# Patient Record
Sex: FEMALE | Race: WHITE
[De-identification: names, ages, dates, MRNs, and addresses within clinical notes are randomized per-mention and may not be internally consistent; named-entity substitution may affect disease eponyms.]

---

## 2017-04-12 ENCOUNTER — HOSPITAL ENCOUNTER (OUTPATIENT)
Dept: HOSPITAL 17 - PLAB | Age: 82
End: 2017-04-12
Attending: INTERNAL MEDICINE
Payer: MEDICARE

## 2017-04-12 DIAGNOSIS — E78.5: ICD-10-CM

## 2017-04-12 DIAGNOSIS — I10: Primary | ICD-10-CM

## 2017-04-12 LAB
ALP SERPL-CCNC: 50 U/L (ref 45–117)
ALT SERPL-CCNC: 27 U/L (ref 10–53)
ANION GAP SERPL CALC-SCNC: 6 MEQ/L (ref 5–15)
AST SERPL-CCNC: 24 U/L (ref 15–37)
BILIRUB SERPL-MCNC: 0.7 MG/DL (ref 0.2–1)
BUN SERPL-MCNC: 14 MG/DL (ref 7–18)
CHLORIDE SERPL-SCNC: 100 MEQ/L (ref 98–107)
ERYTHROCYTE [DISTWIDTH] IN BLOOD BY AUTOMATED COUNT: 13 % (ref 11.6–17.2)
GFR SERPLBLD BASED ON 1.73 SQ M-ARVRAT: 74 ML/MIN (ref 89–?)
HCO3 BLD-SCNC: 29.9 MEQ/L (ref 21–32)
HCT VFR BLD CALC: 39.7 % (ref 35–46)
HDLC SERPL-MCNC: 72.3 MG/DL (ref 40–60)
LDLC SERPL DIRECT ASSAY-MCNC: 101 MG/DL (ref 0–99)
LDLC SERPL-MCNC: 97 MG/DL (ref 0–99)
MCH RBC QN AUTO: 30.7 PG (ref 27–34)
MCHC RBC AUTO-ENTMCNC: 32.5 % (ref 32–36)
MCV RBC AUTO: 94.3 FL (ref 80–100)
PLATELET # BLD: 287 TH/MM3 (ref 150–450)
POTASSIUM SERPL-SCNC: 4.5 MEQ/L (ref 3.5–5.1)
RBC # BLD AUTO: 4.21 MIL/MM3 (ref 4–5.3)
REVIEW FLAG: (no result)
SODIUM SERPL-SCNC: 136 MEQ/L (ref 136–145)
T4 FREE SERPL-MCNC: 0.97 NG/DL (ref 0.76–1.46)
WBC # BLD AUTO: 3.8 TH/MM3 (ref 4–11)

## 2017-04-12 PROCEDURE — 84439 ASSAY OF FREE THYROXINE: CPT

## 2017-04-12 PROCEDURE — 36415 COLL VENOUS BLD VENIPUNCTURE: CPT

## 2017-04-12 PROCEDURE — 80053 COMPREHEN METABOLIC PANEL: CPT

## 2017-04-12 PROCEDURE — 80061 LIPID PANEL: CPT

## 2017-04-12 PROCEDURE — 85027 COMPLETE CBC AUTOMATED: CPT

## 2017-04-12 PROCEDURE — 83721 ASSAY OF BLOOD LIPOPROTEIN: CPT

## 2017-04-12 PROCEDURE — 84443 ASSAY THYROID STIM HORMONE: CPT

## 2017-08-22 ENCOUNTER — HOSPITAL ENCOUNTER (OUTPATIENT)
Dept: HOSPITAL 17 - PLAB | Age: 82
End: 2017-08-22
Attending: INTERNAL MEDICINE
Payer: MEDICARE

## 2017-08-22 DIAGNOSIS — I10: Primary | ICD-10-CM

## 2017-08-22 DIAGNOSIS — E78.5: ICD-10-CM

## 2017-08-22 LAB
ALP SERPL-CCNC: 52 U/L (ref 45–117)
ALT SERPL-CCNC: 28 U/L (ref 10–53)
ANION GAP SERPL CALC-SCNC: 6 MEQ/L (ref 5–15)
AST SERPL-CCNC: 21 U/L (ref 15–37)
BILIRUB SERPL-MCNC: 0.5 MG/DL (ref 0.2–1)
BUN SERPL-MCNC: 14 MG/DL (ref 7–18)
CHLORIDE SERPL-SCNC: 98 MEQ/L (ref 98–107)
ERYTHROCYTE [DISTWIDTH] IN BLOOD BY AUTOMATED COUNT: 12.6 % (ref 11.6–17.2)
GFR SERPLBLD BASED ON 1.73 SQ M-ARVRAT: 93 ML/MIN (ref 89–?)
HCO3 BLD-SCNC: 27.6 MEQ/L (ref 21–32)
HCT VFR BLD CALC: 39.2 % (ref 35–46)
HDLC SERPL-MCNC: 75.3 MG/DL (ref 40–60)
LDLC SERPL DIRECT ASSAY-MCNC: 87 MG/DL (ref 0–99)
LDLC SERPL-MCNC: 82 MG/DL (ref 0–99)
MCH RBC QN AUTO: 31.4 PG (ref 27–34)
MCHC RBC AUTO-ENTMCNC: 33 % (ref 32–36)
MCV RBC AUTO: 95.3 FL (ref 80–100)
PLATELET # BLD: 272 TH/MM3 (ref 150–450)
POTASSIUM SERPL-SCNC: 3.6 MEQ/L (ref 3.5–5.1)
RBC # BLD AUTO: 4.11 MIL/MM3 (ref 4–5.3)
REVIEW FLAG: (no result)
SODIUM SERPL-SCNC: 132 MEQ/L (ref 136–145)
WBC # BLD AUTO: 4.4 TH/MM3 (ref 4–11)

## 2017-08-22 PROCEDURE — 80053 COMPREHEN METABOLIC PANEL: CPT

## 2017-08-22 PROCEDURE — 80061 LIPID PANEL: CPT

## 2017-08-22 PROCEDURE — 36415 COLL VENOUS BLD VENIPUNCTURE: CPT

## 2017-08-22 PROCEDURE — 83721 ASSAY OF BLOOD LIPOPROTEIN: CPT

## 2017-08-22 PROCEDURE — 85027 COMPLETE CBC AUTOMATED: CPT

## 2017-09-08 ENCOUNTER — HOSPITAL ENCOUNTER (EMERGENCY)
Dept: HOSPITAL 17 - PHED | Age: 82
Discharge: HOME | End: 2017-09-08
Payer: MEDICARE

## 2017-09-08 VITALS
DIASTOLIC BLOOD PRESSURE: 72 MMHG | OXYGEN SATURATION: 98 % | RESPIRATION RATE: 15 BRPM | HEART RATE: 73 BPM | TEMPERATURE: 98.2 F | SYSTOLIC BLOOD PRESSURE: 158 MMHG

## 2017-09-08 VITALS — DIASTOLIC BLOOD PRESSURE: 72 MMHG | SYSTOLIC BLOOD PRESSURE: 150 MMHG

## 2017-09-08 VITALS — BODY MASS INDEX: 23.52 KG/M2 | WEIGHT: 124.56 LBS | HEIGHT: 61 IN

## 2017-09-08 DIAGNOSIS — Z23: ICD-10-CM
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
## 2017-09-08 DIAGNOSIS — S81.812A: Primary | ICD-10-CM

## 2017-09-08 PROCEDURE — 12035 INTMD RPR S/A/T/EXT 12.6-20: CPT

## 2017-09-08 PROCEDURE — 73590 X-RAY EXAM OF LOWER LEG: CPT

## 2017-09-08 PROCEDURE — 90714 TD VACC NO PRESV 7 YRS+ IM: CPT

## 2017-09-08 PROCEDURE — 90471 IMMUNIZATION ADMIN: CPT

## 2017-09-08 NOTE — RADRPT
EXAM DATE/TIME:  09/08/2017 18:27 

 

[facility] COMPARISON:     

No previous studies available for comparison.

 

                     

INDICATIONS :     

Left distal tibia/fibula pain with laceration. 

                     

 

MEDICAL HISTORY :     

Hypercholesterolemia.  Arthritis.        

 

SURGICAL HISTORY :     

Tonsillectomy. Total knee replacement, right.  Cataract surgery

 

ENCOUNTER:     

Initial                                        

 

ACUITY:     

1 day      

 

PAIN SCORE:     

5/10

 

LOCATION:     

Left distal tibia/fibula

 

FINDINGS:     

Two view examination of the left tibia demonstrates no evidence of fracture or dislocation. Laceratio
n present distal leg with no radiopaque foreign body.

 

CONCLUSION:     

1. No acute bony abnormality. Soft tissue laceration distal left leg

 

 

 

 Gaurav Matos MD on September 08, 2017 at 18:53           

Board Certified Radiologist.

 This report was verified electronically.

## 2017-09-08 NOTE — PD
Physical Exam


Date Seen by Provider:  Sep 8, 2017


Time Seen by Provider:  20:05


Narrative


80-year-old female that presents to the ED for evaluation of laceration.  I was 

asked by my attending to repair the laceration.  Please refer to his note.





Data


Data


Last Documented VS





Vital Signs








  Date Time  Temp Pulse Resp B/P (MAP) Pulse Ox O2 Delivery O2 Flow Rate FiO2


 


9/8/17 17:50 98.2 73 15 158/72 (100) 98   








Orders





 Orders


Tibia/Fibula (Ap/Lat) (9/8/17 18:03)


Tetanus/Diphtheria Tox Adult (Tetanus/Di (9/8/17 18:00)


Lidocai-Epi 1%-1:100,000 Inj (Xylocaine- (9/8/17 18:00)


Cephalexin (Keflex) (9/8/17 19:30)








MDM


Medical Record Reviewed:  Yes


Supervised Visit with HARLAN:  No


Procedures


**Procedure Narrative**


LACERATION


LOCATION: left leg


LENGTH: 15 cm 


NUMBER OF STITCHES/STAPLES: 20 sutures, 7 deep sutures





REPAIR: The area of the laceration was prepped with Betadine and sterilely 

draped.  The laceration was infiltrated with 1% Xylocaine.  The wound was 

copiously irrigated and explored without evidence of foreign body, tendon 

injury or neurovascular injury.  The wound was closed using 3-0 Vycril and 

Prolene. This was a 2 layer repair. A sterile dressing was applied. The patient 

was advised to keep the dressing clean and dry. Patient tolerated the procedure 

well.





Diagnosis





 Primary Impression:  


 Laceration of left lower leg without complication


 Qualified Codes:  S81.812A - Laceration without foreign body, left lower leg, 

initial encounter


Patient Instructions:  General Instructions





***Additional Instruction:  


Wound care daily.  Keflex as directed.  Return in 2 days for recheck and 14 

days for suture removal.  Tylenol for pain.  Tylenol with codeine for severe 

pain.  Take stool softener with codeine.


Scripts


Acetaminophen-Codeine (Tylenol-Codeine #3) 300-30 mg Tab


1-2 TAB PO Q6H Y for PAIN, #20 TAB 0 Refills


   Prov: Curtis William MD         9/8/17 


Cephalexin (Keflex) 500 Mg Capsule


500 MG PO TID for Infection, #21 CAP 0 Refills


   Prov: Curtis William MD         9/8/17


Disposition:  01 DISCHARGE HOME


Condition:  Stable











Francisco Reed Sep 8, 2017 20:07

## 2017-09-08 NOTE — PD
HPI


Chief Complaint:  Fall


Time Seen by Provider:  17:56


Travel History


International Travel<30 days:  No


Contact w/Intl Traveler<30days:  No


Traveled to known affect area:  No





History of Present Illness


HPI


88-year-old female complains of left leg laceration.  Patient states that she 

found this afternoon.  Patient denies loss of consciousness.  Patient denies 

any headache or neck pain.  Patient denies any chest pain or shortness of 

breath.  Patient denies abdominal pain.  Patient denies any back pain.  Patient 

denies any focal weakness or numbness of extremity.  Patient states that she 

has some burning pain left lower leg area around the laceration area.  Patient'

s not up-to-date with TD booster.





PFSH


Past Medical History


Hx Anticoagulant Therapy:  No


Arthritis:  Yes (both hands)


Asthma:  No


Heart Rhythm Problems:  No


Cardiovascular Problems:  No


High Cholesterol:  Yes


Chest Pain:  No


Congestive Heart Failure:  No


COPD:  No


Cerebrovascular Accident:  No


Diminished Hearing:  No


GERD:  No


Genitourinary:  No


Headaches:  No


Hepatitis:  No


Hiatal Hernia:  No


Hypertension:  No


Kidney Stones:  No


Musculoskeletal:  No


Neurologic:  No


Reproductive:  No


Respiratory:  No


Myocardial Infarction:  No


Renal Failure:  No


Seizures:  No


Sleep Apnea:  No


Ulcer:  No


Menopausal:  Yes





Past Surgical History


Abdominal Surgery:  No


Appendectomy:  No


Cardiac Surgery:  No


Cholecystectomy:  No


Ear Surgery:  No


Endocrine Surgery:  No


Eye Surgery:  Yes (cataract)


Genitourinary Surgery:  No


Gynecologic Surgery:  No


Oral Surgery:  No


Thoracic Surgery:  No


Other Surgery:  Yes (tonsilectomy, cateract, cosmetic neck surgery)





Social History


Alcohol Use:  Yes (OCCAIONALLY)


Tobacco Use:  No


Substance Use:  No





Allergies-Medications


(Allergen,Severity, Reaction):  


Coded Allergies:  


     No Known Allergies (Verified , 9/8/17)


Reported Meds & Prescriptions





Reported Meds & Active Scripts


Active


Reported


Restasis Opth 0.05% (Cyclosporine Opth 0.05%) 0.05% Emul 1 Drop EACH EYE BID


Aspirin 81 Mg Chew 81 Mg CHEW DAILY


Levothyroxine (Levothyroxine Sodium) 25 Mcg Tab 25 Mcg PO DAILY


Klor-Con 8 (Potassium Chloride) 8 Meq Tab 8 Meq PO DAILY


Atorvastatin (Atorvastatin Calcium) 10 Mg Tab 10 Mg PO HS








Review of Systems


General / Constitutional:  No: Fever


Eyes:  No: Visual changes


HENT:  No: Headaches


Cardiovascular:  No: Chest Pain or Discomfort


Respiratory:  No: Shortness of Breath


Gastrointestinal:  No: Abdominal Pain


Genitourinary:  No: Dysuria


Musculoskeletal:  Positive: Pain


Skin:  No Rash


Neurologic:  No: Weakness


Psychiatric:  No: Depression


Endocrine:  No: Polydipsia


Hematologic/Lymphatic:  No: Easy Bruising





Physical Exam


Narrative


GENERAL: Well-nourished, well-developed patient.


SKIN: Focused skin assessment warm/dry.


HEAD: Normocephalic.


EYES: No scleral icterus. No injection or drainage. 


NECK: Supple, trachea midline. No JVD or lymphadenopathy.


CARDIOVASCULAR: Regular rate and rhythm without murmurs, gallops, or rubs. 


RESPIRATORY: Breath sounds equal bilaterally. No accessory muscle use.


GASTROINTESTINAL: Abdomen soft, non-tender, nondistended. 


MUSCULOSKELETAL: No cyanosis, or edema. 


BACK: Nontender without obvious deformity. No CVA tenderness.


Patient had 12 cm laceration left low leg.  Soft tissue swelling noted.  Minor 

bleeding noted.  Sensorimotor function distally intact.





Data


Data


Last Documented VS





Vital Signs








  Date Time  Temp Pulse Resp B/P (MAP) Pulse Ox O2 Delivery O2 Flow Rate FiO2


 


9/8/17 17:50 98.2 73 15 158/72 (100) 98   








Orders





 Orders


Tibia/Fibula (Ap/Lat) (9/8/17 18:03)


Tetanus/Diphtheria Tox Adult (Tetanus/Di (9/8/17 18:00)


Lidocai-Epi 1%-1:100,000 Inj (Xylocaine- (9/8/17 18:00)








MDM


Medical Decision Making


Medical Screen Exam Complete:  Yes


Emergency Medical Condition:  Yes


Interpretation(s)





Last Impressions








Tibia/Fibula X-Ray 9/8/17 1803 Signed





Impressions: 





 Service Date/Time:  Friday, September 8, 2017 18:27 - CONCLUSION:  1. No acute 





 bony abnormality. Soft tissue laceration distal left leg     Gaurav Matos MD 








Differential Diagnosis


Differential diagnosis including laceration, fracture, ligament tendon joint, 

or vascular involvement.


Narrative Course


88-year-old female with left low leg laceration.  TD booster given.  Keflex 500 

mg by mouth given.





Diagnosis





 Primary Impression:  


 Laceration of left lower leg without complication


 Qualified Codes:  S81.812A - Laceration without foreign body, left lower leg, 

initial encounter


Patient Instructions:  General Instructions





***Additional Instructions:  


Wound care daily.  Keflex as directed.  Return in 2 days for recheck and 14 

days for suture removal.  Tylenol for pain.  Tylenol with codeine for severe 

pain.  Take stool softener with codeine.


***Med/Other Pt SpecificInfo:  Prescription(s) given


Scripts


Acetaminophen-Codeine (Tylenol-Codeine #3) 300-30 mg Tab


1-2 TAB PO Q6H Y for PAIN, #20 TAB 0 Refills


   Prov: Curtis William MD         9/8/17 


Cephalexin (Keflex) 500 Mg Capsule


500 MG PO TID for Infection, #21 CAP 0 Refills


   Prov: Curtis William MD         9/8/17


Disposition:  01 DISCHARGE HOME


Condition:  Stable











Curtis William MD Sep 8, 2017 18:20

## 2018-01-05 ENCOUNTER — HOSPITAL ENCOUNTER (OUTPATIENT)
Dept: HOSPITAL 17 - PLAB | Age: 83
End: 2018-01-05
Attending: INTERNAL MEDICINE
Payer: MEDICARE

## 2018-01-05 DIAGNOSIS — I10: Primary | ICD-10-CM

## 2018-01-05 LAB
ALBUMIN SERPL-MCNC: 3.7 GM/DL (ref 3.4–5)
ALP SERPL-CCNC: 57 U/L (ref 45–117)
ALT SERPL-CCNC: 30 U/L (ref 10–53)
AST SERPL-CCNC: 28 U/L (ref 15–37)
BILIRUB SERPL-MCNC: 0.7 MG/DL (ref 0.2–1)
BUN SERPL-MCNC: 13 MG/DL (ref 7–18)
CALCIUM SERPL-MCNC: 9.2 MG/DL (ref 8.5–10.1)
CHLORIDE SERPL-SCNC: 96 MEQ/L (ref 98–107)
CHOLEST SERPL-MCNC: 171 MG/DL (ref 120–200)
CHOLESTEROL/ HDL RATIO: 2.12 RATIO
CREAT SERPL-MCNC: 0.67 MG/DL (ref 0.5–1)
ERYTHROCYTE [DISTWIDTH] IN BLOOD BY AUTOMATED COUNT: 14.3 % (ref 11.6–17.2)
GFR SERPLBLD BASED ON 1.73 SQ M-ARVRAT: 83 ML/MIN (ref 89–?)
HCO3 BLD-SCNC: 28.1 MEQ/L (ref 21–32)
HCT VFR BLD CALC: 37.6 % (ref 35–46)
HDLC SERPL-MCNC: 80.4 MG/DL (ref 40–60)
HGB BLD-MCNC: 12.5 GM/DL (ref 11.6–15.3)
LDLC SERPL DIRECT ASSAY-MCNC: 75 MG/DL (ref 0–99)
LDLC SERPL-MCNC: 79 MG/DL (ref 0–99)
MCH RBC QN AUTO: 30 PG (ref 27–34)
MCHC RBC AUTO-ENTMCNC: 33.3 % (ref 32–36)
MCV RBC AUTO: 90.1 FL (ref 80–100)
PLATELET # BLD: 309 TH/MM3 (ref 150–450)
PMV BLD AUTO: 7.1 FL (ref 7–11)
PROT SERPL-MCNC: 7.4 GM/DL (ref 6.4–8.2)
RBC # BLD AUTO: 4.17 MIL/MM3 (ref 4–5.3)
SODIUM SERPL-SCNC: 132 MEQ/L (ref 136–145)
TRIGL SERPL-MCNC: 57 MG/DL (ref 42–150)
WBC # BLD AUTO: 4.2 TH/MM3 (ref 4–11)

## 2018-01-05 PROCEDURE — 85027 COMPLETE CBC AUTOMATED: CPT

## 2018-01-05 PROCEDURE — 36415 COLL VENOUS BLD VENIPUNCTURE: CPT

## 2018-01-05 PROCEDURE — 80061 LIPID PANEL: CPT

## 2018-01-05 PROCEDURE — 80053 COMPREHEN METABOLIC PANEL: CPT

## 2018-01-05 PROCEDURE — 83721 ASSAY OF BLOOD LIPOPROTEIN: CPT

## 2018-05-15 ENCOUNTER — HOSPITAL ENCOUNTER (OUTPATIENT)
Dept: HOSPITAL 17 - PLAB | Age: 83
End: 2018-05-15
Attending: INTERNAL MEDICINE
Payer: MEDICARE

## 2018-05-15 DIAGNOSIS — E03.9: Primary | ICD-10-CM

## 2018-05-15 DIAGNOSIS — E78.5: ICD-10-CM

## 2018-05-15 LAB
ALBUMIN SERPL-MCNC: 3.4 GM/DL (ref 3.4–5)
ALP SERPL-CCNC: 56 U/L (ref 45–117)
ALT SERPL-CCNC: 27 U/L (ref 10–53)
AST SERPL-CCNC: 23 U/L (ref 15–37)
BILIRUB SERPL-MCNC: 0.5 MG/DL (ref 0.2–1)
BUN SERPL-MCNC: 18 MG/DL (ref 7–18)
CALCIUM SERPL-MCNC: 8.8 MG/DL (ref 8.5–10.1)
CHLORIDE SERPL-SCNC: 101 MEQ/L (ref 98–107)
CHOLEST SERPL-MCNC: 167 MG/DL (ref 120–200)
CHOLESTEROL/ HDL RATIO: 2.67 RATIO
COLOR UR: YELLOW
CREAT SERPL-MCNC: 0.72 MG/DL (ref 0.5–1)
ERYTHROCYTE [DISTWIDTH] IN BLOOD BY AUTOMATED COUNT: 13.3 % (ref 11.6–17.2)
GFR SERPLBLD BASED ON 1.73 SQ M-ARVRAT: 76 ML/MIN (ref 89–?)
GLUCOSE UR STRIP-MCNC: (no result) MG/DL
HCO3 BLD-SCNC: 29.1 MEQ/L (ref 21–32)
HCT VFR BLD CALC: 36.9 % (ref 35–46)
HDLC SERPL-MCNC: 62.5 MG/DL (ref 40–60)
HGB BLD-MCNC: 12.3 GM/DL (ref 11.6–15.3)
HGB UR QL STRIP: (no result)
KETONES UR STRIP-MCNC: (no result) MG/DL
LDLC SERPL DIRECT ASSAY-MCNC: 89 MG/DL (ref 0–99)
LDLC SERPL-MCNC: 81 MG/DL (ref 0–99)
MCH RBC QN AUTO: 31.4 PG (ref 27–34)
MCHC RBC AUTO-ENTMCNC: 33.4 % (ref 32–36)
MCV RBC AUTO: 94.2 FL (ref 80–100)
NITRITE UR QL STRIP: (no result)
PLATELET # BLD: 289 TH/MM3 (ref 150–450)
PMV BLD AUTO: 7.5 FL (ref 7–11)
PROT SERPL-MCNC: 6.6 GM/DL (ref 6.4–8.2)
RBC # BLD AUTO: 3.92 MIL/MM3 (ref 4–5.3)
SODIUM SERPL-SCNC: 137 MEQ/L (ref 136–145)
SP GR UR STRIP: 1.02 (ref 1–1.03)
SQUAMOUS #/AREA URNS HPF: 1 /HPF (ref 0–5)
T4 FREE SERPL-MCNC: 0.88 NG/DL (ref 0.76–1.46)
TRIGL SERPL-MCNC: 116 MG/DL (ref 42–150)
URINE LEUKOCYTE ESTERASE: (no result)
WBC # BLD AUTO: 4.4 TH/MM3 (ref 4–11)

## 2018-05-15 PROCEDURE — 81001 URINALYSIS AUTO W/SCOPE: CPT

## 2018-05-15 PROCEDURE — 36415 COLL VENOUS BLD VENIPUNCTURE: CPT

## 2018-05-15 PROCEDURE — 80053 COMPREHEN METABOLIC PANEL: CPT

## 2018-05-15 PROCEDURE — 80061 LIPID PANEL: CPT

## 2018-05-15 PROCEDURE — 85027 COMPLETE CBC AUTOMATED: CPT

## 2018-05-15 PROCEDURE — 84443 ASSAY THYROID STIM HORMONE: CPT

## 2018-05-15 PROCEDURE — 84439 ASSAY OF FREE THYROXINE: CPT

## 2018-05-15 PROCEDURE — 83721 ASSAY OF BLOOD LIPOPROTEIN: CPT
